# Patient Record
Sex: MALE | Race: OTHER | URBAN - METROPOLITAN AREA
[De-identification: names, ages, dates, MRNs, and addresses within clinical notes are randomized per-mention and may not be internally consistent; named-entity substitution may affect disease eponyms.]

---

## 2023-04-14 ENCOUNTER — HOSPITAL ENCOUNTER (OUTPATIENT)
Dept: RADIOLOGY | Facility: MEDICAL CENTER | Age: 43
End: 2023-04-14
Payer: COMMERCIAL

## 2023-04-14 DIAGNOSIS — Z87.898 HISTORY OF SOLITARY PULMONARY NODULE: ICD-10-CM

## 2023-04-14 PROCEDURE — 700117 HCHG RX CONTRAST REV CODE 255

## 2023-04-14 PROCEDURE — 71260 CT THORAX DX C+: CPT

## 2023-04-14 RX ADMIN — IOHEXOL 75 ML: 350 INJECTION, SOLUTION INTRAVENOUS at 10:59

## 2025-01-13 ENCOUNTER — HOSPITAL ENCOUNTER (INPATIENT)
Facility: MEDICAL CENTER | Age: 45
LOS: 2 days | DRG: 390 | End: 2025-01-16
Attending: STUDENT IN AN ORGANIZED HEALTH CARE EDUCATION/TRAINING PROGRAM | Admitting: SURGERY
Payer: COMMERCIAL

## 2025-01-13 DIAGNOSIS — R91.1 PULMONARY NODULE LESS THAN 6 MM DETERMINED BY COMPUTED TOMOGRAPHY OF LUNG: ICD-10-CM

## 2025-01-13 DIAGNOSIS — K56.609 SMALL BOWEL OBSTRUCTION (HCC): ICD-10-CM

## 2025-01-13 DIAGNOSIS — K76.0 HEPATIC STEATOSIS: ICD-10-CM

## 2025-01-13 DIAGNOSIS — I51.7 CARDIOMEGALY: ICD-10-CM

## 2025-01-13 LAB
ALBUMIN SERPL BCP-MCNC: 5 G/DL (ref 3.2–4.9)
ALBUMIN/GLOB SERPL: 1.4 G/DL
ALP SERPL-CCNC: 100 U/L (ref 30–99)
ALT SERPL-CCNC: 58 U/L (ref 2–50)
ANION GAP SERPL CALC-SCNC: 16 MMOL/L (ref 7–16)
AST SERPL-CCNC: 30 U/L (ref 12–45)
BASOPHILS # BLD AUTO: 0.7 % (ref 0–1.8)
BASOPHILS # BLD: 0.12 K/UL (ref 0–0.12)
BILIRUB SERPL-MCNC: 0.5 MG/DL (ref 0.1–1.5)
BUN SERPL-MCNC: 10 MG/DL (ref 8–22)
CALCIUM ALBUM COR SERPL-MCNC: 9.2 MG/DL (ref 8.5–10.5)
CALCIUM SERPL-MCNC: 10 MG/DL (ref 8.5–10.5)
CHLORIDE SERPL-SCNC: 103 MMOL/L (ref 96–112)
CO2 SERPL-SCNC: 21 MMOL/L (ref 20–33)
CREAT SERPL-MCNC: 0.83 MG/DL (ref 0.5–1.4)
EKG IMPRESSION: NORMAL
EOSINOPHIL # BLD AUTO: 0.63 K/UL (ref 0–0.51)
EOSINOPHIL NFR BLD: 3.6 % (ref 0–6.9)
ERYTHROCYTE [DISTWIDTH] IN BLOOD BY AUTOMATED COUNT: 37.8 FL (ref 35.9–50)
GFR SERPLBLD CREATININE-BSD FMLA CKD-EPI: 109 ML/MIN/1.73 M 2
GLOBULIN SER CALC-MCNC: 3.5 G/DL (ref 1.9–3.5)
GLUCOSE SERPL-MCNC: 176 MG/DL (ref 65–99)
HCT VFR BLD AUTO: 53.3 % (ref 42–52)
HGB BLD-MCNC: 18.9 G/DL (ref 14–18)
IMM GRANULOCYTES # BLD AUTO: 0.07 K/UL (ref 0–0.11)
IMM GRANULOCYTES NFR BLD AUTO: 0.4 % (ref 0–0.9)
LIPASE SERPL-CCNC: 40 U/L (ref 11–82)
LYMPHOCYTES # BLD AUTO: 1.9 K/UL (ref 1–4.8)
LYMPHOCYTES NFR BLD: 10.9 % (ref 22–41)
MCH RBC QN AUTO: 29.3 PG (ref 27–33)
MCHC RBC AUTO-ENTMCNC: 35.5 G/DL (ref 32.3–36.5)
MCV RBC AUTO: 82.8 FL (ref 81.4–97.8)
MONOCYTES # BLD AUTO: 0.93 K/UL (ref 0–0.85)
MONOCYTES NFR BLD AUTO: 5.3 % (ref 0–13.4)
NEUTROPHILS # BLD AUTO: 13.82 K/UL (ref 1.82–7.42)
NEUTROPHILS NFR BLD: 79.1 % (ref 44–72)
NRBC # BLD AUTO: 0 K/UL
NRBC BLD-RTO: 0 /100 WBC (ref 0–0.2)
PLATELET # BLD AUTO: 264 K/UL (ref 164–446)
PMV BLD AUTO: 10.5 FL (ref 9–12.9)
POTASSIUM SERPL-SCNC: 4.2 MMOL/L (ref 3.6–5.5)
PROT SERPL-MCNC: 8.5 G/DL (ref 6–8.2)
RBC # BLD AUTO: 6.44 M/UL (ref 4.7–6.1)
SODIUM SERPL-SCNC: 140 MMOL/L (ref 135–145)
WBC # BLD AUTO: 17.5 K/UL (ref 4.8–10.8)

## 2025-01-13 PROCEDURE — 80053 COMPREHEN METABOLIC PANEL: CPT

## 2025-01-13 PROCEDURE — 84484 ASSAY OF TROPONIN QUANT: CPT

## 2025-01-13 PROCEDURE — 36415 COLL VENOUS BLD VENIPUNCTURE: CPT

## 2025-01-13 PROCEDURE — 99285 EMERGENCY DEPT VISIT HI MDM: CPT

## 2025-01-13 PROCEDURE — 85025 COMPLETE CBC W/AUTO DIFF WBC: CPT

## 2025-01-13 PROCEDURE — 93005 ELECTROCARDIOGRAM TRACING: CPT | Mod: TC | Performed by: STUDENT IN AN ORGANIZED HEALTH CARE EDUCATION/TRAINING PROGRAM

## 2025-01-13 PROCEDURE — 93005 ELECTROCARDIOGRAM TRACING: CPT | Mod: TC

## 2025-01-13 PROCEDURE — 83690 ASSAY OF LIPASE: CPT

## 2025-01-13 RX ORDER — SODIUM CHLORIDE 9 MG/ML
INJECTION, SOLUTION INTRAVENOUS ONCE
Status: COMPLETED | OUTPATIENT
Start: 2025-01-14 | End: 2025-01-14

## 2025-01-13 RX ORDER — MORPHINE SULFATE 4 MG/ML
4 INJECTION INTRAVENOUS ONCE
Status: COMPLETED | OUTPATIENT
Start: 2025-01-14 | End: 2025-01-14

## 2025-01-13 RX ORDER — ONDANSETRON 2 MG/ML
8 INJECTION INTRAMUSCULAR; INTRAVENOUS ONCE
Status: COMPLETED | OUTPATIENT
Start: 2025-01-14 | End: 2025-01-14

## 2025-01-13 ASSESSMENT — PAIN DESCRIPTION - PAIN TYPE: TYPE: ACUTE PAIN

## 2025-01-14 ENCOUNTER — APPOINTMENT (OUTPATIENT)
Dept: RADIOLOGY | Facility: MEDICAL CENTER | Age: 45
DRG: 390 | End: 2025-01-14
Attending: SURGERY
Payer: COMMERCIAL

## 2025-01-14 ENCOUNTER — APPOINTMENT (OUTPATIENT)
Dept: RADIOLOGY | Facility: MEDICAL CENTER | Age: 45
DRG: 390 | End: 2025-01-14
Attending: STUDENT IN AN ORGANIZED HEALTH CARE EDUCATION/TRAINING PROGRAM
Payer: COMMERCIAL

## 2025-01-14 PROBLEM — K56.600 SMALL BOWEL OBSTRUCTION, PARTIAL (HCC): Status: ACTIVE | Noted: 2025-01-14

## 2025-01-14 LAB
ANION GAP SERPL CALC-SCNC: 10 MMOL/L (ref 7–16)
APPEARANCE UR: CLEAR
BILIRUB UR QL STRIP.AUTO: NEGATIVE
BUN SERPL-MCNC: 10 MG/DL (ref 8–22)
CALCIUM SERPL-MCNC: 9.3 MG/DL (ref 8.5–10.5)
CHLORIDE SERPL-SCNC: 104 MMOL/L (ref 96–112)
CO2 SERPL-SCNC: 23 MMOL/L (ref 20–33)
COLOR UR: YELLOW
CREAT SERPL-MCNC: 0.87 MG/DL (ref 0.5–1.4)
EKG IMPRESSION: NORMAL
GFR SERPLBLD CREATININE-BSD FMLA CKD-EPI: 108 ML/MIN/1.73 M 2
GLUCOSE BLD STRIP.AUTO-MCNC: 118 MG/DL (ref 65–99)
GLUCOSE BLD STRIP.AUTO-MCNC: 139 MG/DL (ref 65–99)
GLUCOSE SERPL-MCNC: 163 MG/DL (ref 65–99)
GLUCOSE UR STRIP.AUTO-MCNC: NEGATIVE MG/DL
KETONES UR STRIP.AUTO-MCNC: NEGATIVE MG/DL
LEUKOCYTE ESTERASE UR QL STRIP.AUTO: NEGATIVE
MICRO URNS: NORMAL
NITRITE UR QL STRIP.AUTO: NEGATIVE
PH UR STRIP.AUTO: 7.5 [PH] (ref 5–8)
POTASSIUM SERPL-SCNC: 4.5 MMOL/L (ref 3.6–5.5)
PROT UR QL STRIP: NEGATIVE MG/DL
RBC UR QL AUTO: NEGATIVE
SODIUM SERPL-SCNC: 137 MMOL/L (ref 135–145)
SP GR UR STRIP.AUTO: 1.01
TROPONIN T SERPL-MCNC: <6 NG/L (ref 6–19)
UROBILINOGEN UR STRIP.AUTO-MCNC: 1 EU/DL

## 2025-01-14 PROCEDURE — 36415 COLL VENOUS BLD VENIPUNCTURE: CPT

## 2025-01-14 PROCEDURE — 96375 TX/PRO/DX INJ NEW DRUG ADDON: CPT

## 2025-01-14 PROCEDURE — 700105 HCHG RX REV CODE 258: Performed by: SURGERY

## 2025-01-14 PROCEDURE — 700117 HCHG RX CONTRAST REV CODE 255: Performed by: STUDENT IN AN ORGANIZED HEALTH CARE EDUCATION/TRAINING PROGRAM

## 2025-01-14 PROCEDURE — 81003 URINALYSIS AUTO W/O SCOPE: CPT

## 2025-01-14 PROCEDURE — 80048 BASIC METABOLIC PNL TOTAL CA: CPT

## 2025-01-14 PROCEDURE — 700111 HCHG RX REV CODE 636 W/ 250 OVERRIDE (IP): Mod: JZ | Performed by: SURGERY

## 2025-01-14 PROCEDURE — 99222 1ST HOSP IP/OBS MODERATE 55: CPT | Mod: AI | Performed by: SURGERY

## 2025-01-14 PROCEDURE — 770001 HCHG ROOM/CARE - MED/SURG/GYN PRIV*

## 2025-01-14 PROCEDURE — 700105 HCHG RX REV CODE 258: Performed by: STUDENT IN AN ORGANIZED HEALTH CARE EDUCATION/TRAINING PROGRAM

## 2025-01-14 PROCEDURE — 93005 ELECTROCARDIOGRAM TRACING: CPT | Mod: TC | Performed by: PHYSICIAN ASSISTANT

## 2025-01-14 PROCEDURE — 700101 HCHG RX REV CODE 250: Performed by: STUDENT IN AN ORGANIZED HEALTH CARE EDUCATION/TRAINING PROGRAM

## 2025-01-14 PROCEDURE — 96374 THER/PROPH/DIAG INJ IV PUSH: CPT

## 2025-01-14 PROCEDURE — 74177 CT ABD & PELVIS W/CONTRAST: CPT

## 2025-01-14 PROCEDURE — 76705 ECHO EXAM OF ABDOMEN: CPT

## 2025-01-14 PROCEDURE — 93010 ELECTROCARDIOGRAM REPORT: CPT | Performed by: INTERNAL MEDICINE

## 2025-01-14 PROCEDURE — 71045 X-RAY EXAM CHEST 1 VIEW: CPT

## 2025-01-14 PROCEDURE — 96376 TX/PRO/DX INJ SAME DRUG ADON: CPT

## 2025-01-14 PROCEDURE — 82962 GLUCOSE BLOOD TEST: CPT | Mod: 91

## 2025-01-14 PROCEDURE — A9270 NON-COVERED ITEM OR SERVICE: HCPCS | Performed by: SURGERY

## 2025-01-14 PROCEDURE — 700102 HCHG RX REV CODE 250 W/ 637 OVERRIDE(OP): Performed by: SURGERY

## 2025-01-14 PROCEDURE — 700111 HCHG RX REV CODE 636 W/ 250 OVERRIDE (IP): Mod: JZ | Performed by: STUDENT IN AN ORGANIZED HEALTH CARE EDUCATION/TRAINING PROGRAM

## 2025-01-14 RX ORDER — ACETAMINOPHEN 500 MG
1000 TABLET ORAL EVERY 6 HOURS
Status: DISCONTINUED | OUTPATIENT
Start: 2025-01-14 | End: 2025-01-15

## 2025-01-14 RX ORDER — SODIUM CHLORIDE, SODIUM LACTATE, POTASSIUM CHLORIDE, CALCIUM CHLORIDE 600; 310; 30; 20 MG/100ML; MG/100ML; MG/100ML; MG/100ML
INJECTION, SOLUTION INTRAVENOUS CONTINUOUS
Status: DISCONTINUED | OUTPATIENT
Start: 2025-01-14 | End: 2025-01-16

## 2025-01-14 RX ORDER — CELECOXIB 200 MG/1
200 CAPSULE ORAL 2 TIMES DAILY
Status: DISCONTINUED | OUTPATIENT
Start: 2025-01-14 | End: 2025-01-15

## 2025-01-14 RX ORDER — ONDANSETRON 2 MG/ML
4 INJECTION INTRAMUSCULAR; INTRAVENOUS EVERY 4 HOURS PRN
Status: DISCONTINUED | OUTPATIENT
Start: 2025-01-14 | End: 2025-01-16 | Stop reason: HOSPADM

## 2025-01-14 RX ORDER — LIDOCAINE HYDROCHLORIDE 20 MG/ML
JELLY TOPICAL ONCE
Status: COMPLETED | OUTPATIENT
Start: 2025-01-14 | End: 2025-01-14

## 2025-01-14 RX ORDER — DOCUSATE SODIUM 100 MG/1
100 CAPSULE, LIQUID FILLED ORAL 2 TIMES DAILY
Status: DISCONTINUED | OUTPATIENT
Start: 2025-01-14 | End: 2025-01-14

## 2025-01-14 RX ORDER — AMOXICILLIN 250 MG
1 CAPSULE ORAL
Status: DISCONTINUED | OUTPATIENT
Start: 2025-01-14 | End: 2025-01-15

## 2025-01-14 RX ORDER — ENOXAPARIN SODIUM 100 MG/ML
40 INJECTION SUBCUTANEOUS DAILY
Status: DISCONTINUED | OUTPATIENT
Start: 2025-01-15 | End: 2025-01-16 | Stop reason: HOSPADM

## 2025-01-14 RX ORDER — AMOXICILLIN 250 MG
1 CAPSULE ORAL NIGHTLY
Status: DISCONTINUED | OUTPATIENT
Start: 2025-01-14 | End: 2025-01-15

## 2025-01-14 RX ORDER — CELECOXIB 200 MG/1
200 CAPSULE ORAL 2 TIMES DAILY PRN
Status: DISCONTINUED | OUTPATIENT
Start: 2025-01-19 | End: 2025-01-15

## 2025-01-14 RX ORDER — ONDANSETRON 4 MG/1
4 TABLET, ORALLY DISINTEGRATING ORAL EVERY 4 HOURS PRN
Status: DISCONTINUED | OUTPATIENT
Start: 2025-01-14 | End: 2025-01-15

## 2025-01-14 RX ORDER — ACETAMINOPHEN 500 MG
1000 TABLET ORAL EVERY 6 HOURS PRN
Status: DISCONTINUED | OUTPATIENT
Start: 2025-01-19 | End: 2025-01-15

## 2025-01-14 RX ORDER — OXYCODONE HYDROCHLORIDE 10 MG/1
10 TABLET ORAL
Status: DISCONTINUED | OUTPATIENT
Start: 2025-01-14 | End: 2025-01-15

## 2025-01-14 RX ORDER — BISACODYL 10 MG
10 SUPPOSITORY, RECTAL RECTAL
Status: DISCONTINUED | OUTPATIENT
Start: 2025-01-14 | End: 2025-01-16 | Stop reason: HOSPADM

## 2025-01-14 RX ORDER — HYDROMORPHONE HYDROCHLORIDE 1 MG/ML
0.5 INJECTION, SOLUTION INTRAMUSCULAR; INTRAVENOUS; SUBCUTANEOUS
Status: DISCONTINUED | OUTPATIENT
Start: 2025-01-14 | End: 2025-01-15

## 2025-01-14 RX ORDER — SODIUM CHLORIDE, SODIUM LACTATE, POTASSIUM CHLORIDE, AND CALCIUM CHLORIDE .6; .31; .03; .02 G/100ML; G/100ML; G/100ML; G/100ML
1000 INJECTION, SOLUTION INTRAVENOUS ONCE
Status: COMPLETED | OUTPATIENT
Start: 2025-01-14 | End: 2025-01-14

## 2025-01-14 RX ORDER — DOCUSATE SODIUM 50 MG/5ML
100 LIQUID ORAL 2 TIMES DAILY
Status: DISCONTINUED | OUTPATIENT
Start: 2025-01-14 | End: 2025-01-15

## 2025-01-14 RX ORDER — OXYCODONE HYDROCHLORIDE 5 MG/1
5 TABLET ORAL
Status: DISCONTINUED | OUTPATIENT
Start: 2025-01-14 | End: 2025-01-15

## 2025-01-14 RX ORDER — FAMOTIDINE 20 MG/1
20 TABLET, FILM COATED ORAL 2 TIMES DAILY
Status: DISCONTINUED | OUTPATIENT
Start: 2025-01-14 | End: 2025-01-15

## 2025-01-14 RX ORDER — POLYETHYLENE GLYCOL 3350 17 G/17G
1 POWDER, FOR SOLUTION ORAL 2 TIMES DAILY
Status: DISCONTINUED | OUTPATIENT
Start: 2025-01-14 | End: 2025-01-15

## 2025-01-14 RX ADMIN — ACETAMINOPHEN 1000 MG: 500 TABLET ORAL at 18:00

## 2025-01-14 RX ADMIN — SODIUM CHLORIDE: 9 INJECTION, SOLUTION INTRAVENOUS at 00:03

## 2025-01-14 RX ADMIN — HYDROMORPHONE HYDROCHLORIDE 0.5 MG: 1 INJECTION, SOLUTION INTRAMUSCULAR; INTRAVENOUS; SUBCUTANEOUS at 02:06

## 2025-01-14 RX ADMIN — FAMOTIDINE 20 MG: 10 INJECTION, SOLUTION INTRAVENOUS at 05:52

## 2025-01-14 RX ADMIN — MORPHINE SULFATE 4 MG: 4 INJECTION INTRAVENOUS at 00:03

## 2025-01-14 RX ADMIN — SODIUM CHLORIDE, POTASSIUM CHLORIDE, SODIUM LACTATE AND CALCIUM CHLORIDE: 600; 310; 30; 20 INJECTION, SOLUTION INTRAVENOUS at 13:05

## 2025-01-14 RX ADMIN — FAMOTIDINE 20 MG: 10 INJECTION, SOLUTION INTRAVENOUS at 18:00

## 2025-01-14 RX ADMIN — SODIUM CHLORIDE, POTASSIUM CHLORIDE, SODIUM LACTATE AND CALCIUM CHLORIDE 1000 ML: 600; 310; 30; 20 INJECTION, SOLUTION INTRAVENOUS at 10:53

## 2025-01-14 RX ADMIN — ONDANSETRON 8 MG: 2 INJECTION INTRAMUSCULAR; INTRAVENOUS at 00:03

## 2025-01-14 RX ADMIN — FAMOTIDINE 20 MG: 10 INJECTION, SOLUTION INTRAVENOUS at 00:03

## 2025-01-14 RX ADMIN — SODIUM CHLORIDE, POTASSIUM CHLORIDE, SODIUM LACTATE AND CALCIUM CHLORIDE: 600; 310; 30; 20 INJECTION, SOLUTION INTRAVENOUS at 21:54

## 2025-01-14 RX ADMIN — ACETAMINOPHEN 1000 MG: 500 TABLET ORAL at 22:02

## 2025-01-14 RX ADMIN — LIDOCAINE HYDROCHLORIDE 1 APPLICATION: 20 JELLY TOPICAL at 01:05

## 2025-01-14 RX ADMIN — SENNOSIDES AND DOCUSATE SODIUM 1 TABLET: 50; 8.6 TABLET ORAL at 21:53

## 2025-01-14 RX ADMIN — IOHEXOL 100 ML: 350 INJECTION, SOLUTION INTRAVENOUS at 00:33

## 2025-01-14 RX ADMIN — SODIUM CHLORIDE, POTASSIUM CHLORIDE, SODIUM LACTATE AND CALCIUM CHLORIDE: 600; 310; 30; 20 INJECTION, SOLUTION INTRAVENOUS at 01:40

## 2025-01-14 RX ADMIN — OXYCODONE 5 MG: 5 TABLET ORAL at 18:01

## 2025-01-14 RX ADMIN — CELECOXIB 200 MG: 200 CAPSULE ORAL at 18:00

## 2025-01-14 RX ADMIN — CELECOXIB 200 MG: 200 CAPSULE ORAL at 05:52

## 2025-01-14 SDOH — ECONOMIC STABILITY: TRANSPORTATION INSECURITY
IN THE PAST 12 MONTHS, HAS LACK OF RELIABLE TRANSPORTATION KEPT YOU FROM MEDICAL APPOINTMENTS, MEETINGS, WORK OR FROM GETTING THINGS NEEDED FOR DAILY LIVING?: NO

## 2025-01-14 SDOH — ECONOMIC STABILITY: TRANSPORTATION INSECURITY
IN THE PAST 12 MONTHS, HAS THE LACK OF TRANSPORTATION KEPT YOU FROM MEDICAL APPOINTMENTS OR FROM GETTING MEDICATIONS?: NO

## 2025-01-14 ASSESSMENT — PATIENT HEALTH QUESTIONNAIRE - PHQ9
1. LITTLE INTEREST OR PLEASURE IN DOING THINGS: NOT AT ALL
2. FEELING DOWN, DEPRESSED, IRRITABLE, OR HOPELESS: NOT AT ALL
SUM OF ALL RESPONSES TO PHQ9 QUESTIONS 1 AND 2: 0

## 2025-01-14 ASSESSMENT — LIFESTYLE VARIABLES
AVERAGE NUMBER OF DAYS PER WEEK YOU HAVE A DRINK CONTAINING ALCOHOL: 0
HOW MANY TIMES IN THE PAST YEAR HAVE YOU HAD 5 OR MORE DRINKS IN A DAY: 0
ON A TYPICAL DAY WHEN YOU DRINK ALCOHOL HOW MANY DRINKS DO YOU HAVE: 0
TOTAL SCORE: 0
ALCOHOL_USE: NO
HAVE PEOPLE ANNOYED YOU BY CRITICIZING YOUR DRINKING: NO
EVER HAD A DRINK FIRST THING IN THE MORNING TO STEADY YOUR NERVES TO GET RID OF A HANGOVER: NO
DOES PATIENT WANT TO STOP DRINKING: NO
TOTAL SCORE: 0
CONSUMPTION TOTAL: NEGATIVE
EVER FELT BAD OR GUILTY ABOUT YOUR DRINKING: NO
HAVE YOU EVER FELT YOU SHOULD CUT DOWN ON YOUR DRINKING: NO
TOTAL SCORE: 0

## 2025-01-14 ASSESSMENT — COPD QUESTIONNAIRES
DO YOU EVER COUGH UP ANY MUCUS OR PHLEGM?: NO/ONLY WITH OCCASIONAL COLDS OR INFECTIONS
HAVE YOU SMOKED AT LEAST 100 CIGARETTES IN YOUR ENTIRE LIFE: NO/DON'T KNOW
DURING THE PAST 4 WEEKS HOW MUCH DID YOU FEEL SHORT OF BREATH: NONE/LITTLE OF THE TIME
COPD SCREENING SCORE: 0

## 2025-01-14 ASSESSMENT — COGNITIVE AND FUNCTIONAL STATUS - GENERAL
SUGGESTED CMS G CODE MODIFIER MOBILITY: CI
DRESSING REGULAR LOWER BODY CLOTHING: A LITTLE
MOBILITY SCORE: 23
DAILY ACTIVITIY SCORE: 22
MOVING TO AND FROM BED TO CHAIR: A LITTLE
SUGGESTED CMS G CODE MODIFIER DAILY ACTIVITY: CJ
DRESSING REGULAR UPPER BODY CLOTHING: A LITTLE

## 2025-01-14 ASSESSMENT — SOCIAL DETERMINANTS OF HEALTH (SDOH)
WITHIN THE LAST YEAR, HAVE YOU BEEN HUMILIATED OR EMOTIONALLY ABUSED IN OTHER WAYS BY YOUR PARTNER OR EX-PARTNER?: NO
IN THE PAST 12 MONTHS, HAS THE ELECTRIC, GAS, OIL, OR WATER COMPANY THREATENED TO SHUT OFF SERVICE IN YOUR HOME?: NO
WITHIN THE PAST 12 MONTHS, YOU WORRIED THAT YOUR FOOD WOULD RUN OUT BEFORE YOU GOT THE MONEY TO BUY MORE: NEVER TRUE
WITHIN THE LAST YEAR, HAVE YOU BEEN KICKED, HIT, SLAPPED, OR OTHERWISE PHYSICALLY HURT BY YOUR PARTNER OR EX-PARTNER?: NO
WITHIN THE LAST YEAR, HAVE YOU BEEN AFRAID OF YOUR PARTNER OR EX-PARTNER?: NO
WITHIN THE LAST YEAR, HAVE TO BEEN RAPED OR FORCED TO HAVE ANY KIND OF SEXUAL ACTIVITY BY YOUR PARTNER OR EX-PARTNER?: NO
WITHIN THE PAST 12 MONTHS, THE FOOD YOU BOUGHT JUST DIDN'T LAST AND YOU DIDN'T HAVE MONEY TO GET MORE: NEVER TRUE

## 2025-01-14 ASSESSMENT — PAIN DESCRIPTION - PAIN TYPE
TYPE: ACUTE PAIN

## 2025-01-14 NOTE — ED NOTES
Bedside report received from off going RN/tech: Tanika, assumed care of patient.  POC discussed with patient. Call light within reach, all needs addressed at this time.       Fall risk interventions in place: Give patient urinal if applicable (all applicable per [place] Fall risk assessment)   Continuous monitoring: Cardiac Leads, Pulse Ox, or Blood Pressure  IVF/IV medications: Infusion per MAR (List Med(s)) LR  Oxygen: How many liters 4L, Traced the line to wall oxygen, and No oxygen tank in room  Bedside sitter: Not Applicable   Isolation: Not Applicable

## 2025-01-14 NOTE — ED NOTES
Report to FERNANDA Sagastume    Pt on 4LPM via oxymask to wall O2. Pt does not require fall precautions at this time.

## 2025-01-14 NOTE — PROGRESS NOTES
"Patient admitted to room T405 via transport in [facility] from ED at 1209.  /82   Pulse 94   Temp 36.9 °C (98.4 °F) (Temporal)   Resp 16   Ht 1.65 m (5' 4.96\")   Wt 86 kg (189 lb 9.5 oz)   SpO2 95%   BMI 31.59 kg/m²     Patient reports pain at 0 on a scale of 0-10. Educated patient regarding pharmacologic and non pharmacologic modalities for pain management. Oriented to room call light and smoking policy.  Reviewed plan of care (equipment, incentive spirometer, sequential compression devices, medications, activity, diet, fall precautions, skin care, and pain) with patient and family. Welcome packet given and reviewed with patient, all questions answered. Education provided on oral hygiene program.    AA&Ox4. Denies CP/SOB.  See 2 RN skin note  Patient is NPO sips w/meds. Denies N/V.  + void. + BM. Last BM 1/14  Pt ambulates w/standby assist.  All needs met at this time. Call light within reach. Pt calls appropriately. Bed low and locked, non skid socks in place. Hourly rounding in place.  "

## 2025-01-14 NOTE — ED NOTES
Pt reports feeling super hot, vitals assessed and pt appears to have had vagal response. Suction paused and admitting MD contacted. Admitting MD confirms suspicion of vagal response, reports to start suction again and watch pt BP, if no improvement provide bolus. NG returned to low suction    Pt vitals noted to improve and pt medicated for pain

## 2025-01-14 NOTE — ED NOTES
Med Rec competed per patient and son at bedside interpreting      Allergies reviewed: yes     Antibiotics in the past 30 days: no    Anticoagulant in past 14 days: no    Pharmacy patient utilizes: [facility]  [phone]    Per patient he is not taking any RX or OTC medications at this time

## 2025-01-14 NOTE — PROGRESS NOTES
Virtual Nurse rounding complete. VRN portion of admission profile completed using  #[id number]    Round Needs: Reports new mild epigastric pain. RN notified.

## 2025-01-14 NOTE — ED PROVIDER NOTES
"ED Provider Note    CHIEF COMPLAINT  Chief Complaint   Patient presents with    Epigastric Pain     Started today while working, , 10/10 pain, feels bloated, +nausea, -V/D.         HPI/ROS  LIMITATION TO HISTORY   Select: : None  OUTSIDE HISTORIAN(S):    Jamey Mojica is a 44 y.o. male who presents with acute onset abdominal pain that started at 1 PM today.  Patient reports that he had ham and eggs and coffee in the morning and chicken soup in the afternoon.  Patient reports nausea but no vomiting.  Patient denies black or bloody stools.  Patient reports feeling bloated.  Patient reports severe pain.  Patient reports pain is mainly in the upper abdomen.  Patient has no history of surgeries.  Patient reports he has not passed stool at all today.    PAST MEDICAL HISTORY       SURGICAL HISTORY  patient denies any surgical history    FAMILY HISTORY  History reviewed. No pertinent family history.    SOCIAL HISTORY  Social History     Tobacco Use    Smoking status: Never    Smokeless tobacco: Never   Substance and Sexual Activity    Alcohol use: Not Currently    Drug use: Not Currently    Sexual activity: Not on file       CURRENT MEDICATIONS  Home Medications       Reviewed by Carl Gao R.N. (Registered Nurse) on 01/13/25 at 2142  Med List Status: Partial     Medication Last Dose Status        Patient Alex Taking any Medications                         Audit from Redirected Encounters    **Home medications have not yet been reviewed for this encounter**         ALLERGIES  No Known Allergies    PHYSICAL EXAM  VITAL SIGNS: BP (!) 131/101   Pulse 83   Temp 36.7 °C (98 °F) (Temporal)   Resp 15   Ht 1.65 m (5' 4.96\")   Wt 86 kg (189 lb 9.5 oz)   SpO2 95%   BMI 31.59 kg/m²    Vitals and nursing note reviewed.   Constitutional:       Comments: Patient is lying in bed supine, pleasant, conversant, speaking in complete sentences, uncomfortable appearing  HENT:      Head: Normocephalic and " atraumatic.   Eyes:      Extraocular Movements: Extraocular movements intact.      Conjunctiva/sclera: Conjunctivae normal.      Pupils: Pupils are equal, round, and reactive to light.   Cardiovascular:      Pulses: Normal pulses.      Comments: HR 96  Pulmonary:      Effort: Pulmonary effort is normal. No respiratory distress.   Abdominal:      Comments: Abdomen is soft, distended, decreased bowel sounds, significant upper abdominal tenderness to palpation  Musculoskeletal:         General: No swelling. Normal range of motion.      Cervical back: Normal range of motion. No rigidity.   Skin:     General: Skin is warm and dry.      Capillary Refill: Capillary refill takes less than 2 seconds.   Neurological:      Mental Status: Alert.       EKG/LABS  No ischemic changes  I have independently interpreted this EKG    RADIOLOGY/PROCEDURES   I have independently interpreted the diagnostic imaging associated with this visit and am waiting the final reading from the radiologist.   My preliminary interpretation is as follows: Small bowel obstruction    Radiologist interpretation:  DX-CHEST-LIMITED (1 VIEW)   Final Result         1.  Left basilar atelectasis, no focal infiltrate   2.  Cardiomegaly      CT-ABDOMEN-PELVIS WITH   Final Result         1.  Fluid-filled proximal small bowel loops with distal decompression and probable right lower abdominal transition point, appearance favoring changes of small bowel obstruction.   2.  Hepatomegaly   3.  Changes of hepatic steatosis   4.  Pulmonary nodules, see nodule follow-up recommendations below.      Fleischner Society pulmonary nodule recommendations:   Low Risk: No routine follow-up      High Risk: Optional CT at 12 months      Comments: Use most suspicious nodule as guide to management. Follow-up intervals may vary according to size and risk.      Low Risk - Minimal or absent history of smoking and of other known risk factors.      High Risk - History of smoking or of other  known risk factors.      Note: These recommendations do not apply to lung cancer screening, patients with immunosuppression, or patients with known primary cancer.      Fleischner Society 2017 Guidelines for Management of Incidentally Detected Pulmonary Nodules in Adults         US-RUQ   Final Result         1.  Hepatomegaly   2.  Echogenic liver, compatible with fatty change versus fibrosis.          COURSE & MEDICAL DECISION MAKING    ASSESSMENT, COURSE AND PLAN  Care Narrative: Morphine, famotidine, Zofran for symptom control.  CBC to evaluate for acute anemia and leukocytosis.  CMP to evaluate for acute electrolyte abnormality, acute kidney injury, acute liver failure or dysfunction.  CT abdomen pelvis to evaluate for obstruction.  Ultrasound to evaluate for cholecystitis.  Chest x-ray to evaluate for acute cardiopulmonary process such as pneumonia, pulmonary edema, pneumothorax.  Lipase to evaluate for pancreatitis.    Electronically signed by: Mert White M.D., 1/13/2025 11:59 PM    Patient with evidence of small bowel obstruction.  NG order has been placed.  General surgery will come to the bedside and admit the patient under the care of Dr. Vazquez.  Pulmonary nodules present as well.  Patient has no elevated risk however.    This dictation has been created using voice recognition software. I am continuously working with the software to minimize the number of voice recognition errors and I have made every attempt to manually correct the errors within my dictation. However errors  related to this voice recognition software may still exist and should be interpreted within the appropriate context.     Electronically signed by: Mert White M.D., 1/14/2025 1:09 AM      DISPOSITION AND DISCUSSIONS  I have discussed management of the patient with the following physicians and PAT's:  Dr Vazquez    FINAL DIAGNOSIS  1. Pulmonary nodule less than 6 mm determined by computed tomography of lung    2. Small  bowel obstruction (HCC)    3. Hepatic steatosis    4. Cardiomegaly         Electronically signed by: Mert White M.D., 1/13/2025 11:56 PM

## 2025-01-14 NOTE — H&P
"    DATE OF CONSULTATION:  1/14/2025     REFERRING PHYSICIAN:   Mert White M.D.     CONSULTING PHYSICIAN:  Chaz Vazquez M.D.     REASON FOR CONSULTATION:  I have been asked by Dr. White to see the patient in surgical consultation for evaluation of a small bowel obstruction.    HISTORY OF PRESENT ILLNESS: The patient is a 44 year-old  man who presents to the Emergency Department with a 12- hour history of severe epigastric abdominal pain. The pain is associated with nausea, vomiting, and abdominal distension. He The patient denies any recent or intercurrent illness. The patient denies any history of previous abdominal surgery. The patient denies any previous surgery for obstructive symptoms.    PAST MEDICAL HISTORY:  has no past medical history on file.    PAST SURGICAL HISTORY:  has no past surgical history on file.    ALLERGIES: No Known Allergies    CURRENT MEDICATIONS:    Home Medications       Reviewed by Carl Gao R.N. (Registered Nurse) on 01/13/25 at 2142  Med List Status: Partial     Medication Last Dose Status        Patient Alex Taking any Medications                         Audit from Redirected Encounters    **Home medications have not yet been reviewed for this encounter**     FAMILY HISTORY: family history is not on file.    SOCIAL HISTORY:  reports that he has never smoked. He has never used smokeless tobacco. He reports that he does not currently use alcohol. He reports that he does not currently use drugs.    REVIEW OF SYSTEMS: Comprehensive review of systems is negative with the exception of the aforementioned HPI, PMH, and PSH bullets in accordance with CMS guidelines.    PHYSICAL EXAMINATION:      Constitutional:     Vital Signs: BP (!) 131/101   Pulse 83   Temp 36.7 °C (98 °F) (Temporal)   Resp 15   Ht 1.65 m (5' 4.96\")   Wt 86 kg (189 lb 9.5 oz)   SpO2 95%    General Appearance: alert in no acute distress.   HEENT:    Demonstrates symmetric, reactive " pupils. Extraocular muscles   are intact. Nares and oropharynx are clear.   Neck:    Supple. No adenopathy.  Respiratory:   Inspection: Unlabored respirations, no intercostal retractions, paradoxical motion, or accessory muscle use.   Auscultation: clear to auscultation.  Cardiovascular:   Inspection: The skin is warm, dry, and well purfused.  Auscultation: Regular rate and rhythm.   Peripheral Pulses: Normal.   Abdomen:  Inspection: Abdominal inspection reveals mild abdominal distension.   Palpation: Palpation is remarkable for moderate tenderness in the epigastric region. No abdominal wall hernias.  Extremities:   Examination of the upper and lower extremities demonstrates no cyanosis edema or clubbing.  Neurologic:   Alert & oriented to person, time and place. Normal motor function. Normal sensory function. No focal deficits noted.  Psychiatric:   He does not appear depressed or anxious, short and long term memory appears intact, judgement and insight appear intact.    LABORATORY VALUES:   Recent Labs     01/13/25  2149   WBC 17.5*   RBC 6.44*   HEMOGLOBIN 18.9*   HEMATOCRIT 53.3*   MCV 82.8   MCH 29.3   MCHC 35.5   RDW 37.8   PLATELETCT 264   MPV 10.5     Recent Labs     01/13/25  2149   SODIUM 140   POTASSIUM 4.2   CHLORIDE 103   CO2 21   GLUCOSE 176*   BUN 10   CREATININE 0.83   CALCIUM 10.0     Recent Labs     01/13/25  2149   ASTSGOT 30   ALTSGPT 58*   TBILIRUBIN 0.5   ALKPHOSPHAT 100*   GLOBULIN 3.5     IMAGING:   DX-CHEST-LIMITED (1 VIEW)   Final Result         1.  Left basilar atelectasis, no focal infiltrate   2.  Cardiomegaly      CT-ABDOMEN-PELVIS WITH   Final Result         1.  Fluid-filled proximal small bowel loops with distal decompression and probable right lower abdominal transition point, appearance favoring changes of small bowel obstruction.   2.  Hepatomegaly   3.  Changes of hepatic steatosis   4.  Pulmonary nodules, see nodule follow-up recommendations below.      Fleischner Society  pulmonary nodule recommendations:   Low Risk: No routine follow-up      High Risk: Optional CT at 12 months      Comments: Use most suspicious nodule as guide to management. Follow-up intervals may vary according to size and risk.      Low Risk - Minimal or absent history of smoking and of other known risk factors.      High Risk - History of smoking or of other known risk factors.      Note: These recommendations do not apply to lung cancer screening, patients with immunosuppression, or patients with known primary cancer.      Fleischner Society 2017 Guidelines for Management of Incidentally Detected Pulmonary Nodules in Adults         US-RUQ   Final Result         1.  Hepatomegaly   2.  Echogenic liver, compatible with fatty change versus fibrosis.        ASSESSMENT AND PLAN:   The patient has clinical and radiographic findings of partial small bowel obstruction without generalized peritonitis, evidence of clinical deterioration, or radiographic findings consistent with bowel ischemia. Nonoperative management, nasogastric decompression, serial abdominal examination, and water-soluble contrast imaging within 48 hours if obstructive symptoms fail to resolve.     DISPOSITION: Admit [facility] Acute Care Surgery [place] Service.         ____________________________________     Chaz Vazquez M.D.    DD: 1/14/2025  1:06 AM

## 2025-01-14 NOTE — PROGRESS NOTES
"  DATE: 1/14/2025    Hospital Day 1 SBO .    INTERVAL EVENTS:  Abdominal pain improved some with NG tube decompression.  No flatus or BM.   Denies surgical history.     REVIEW OF SYSTEMS:  Comprehensive review of systems is negative with the exception of the aforementioned HPI, PMH, and PSH bullets in accordance with CMS guidelines.    PHYSICAL EXAMINATION:  Vital Signs: /87   Pulse 95   Temp 36.7 °C (98 °F) (Temporal)   Resp 14   Ht 1.65 m (5' 4.96\")   Wt 86 kg (189 lb 9.5 oz)   SpO2 94%   Physical Exam  Vitals and nursing note reviewed.   HENT:      Nose:      Comments: NG tube right nare to suction   Abdominal:      General: There is distension (mild).      Palpations: Abdomen is soft.      Tenderness: There is abdominal tenderness (diffuse).   Neurological:      Mental Status: He is alert.         LABORATORY VALUES:  Recent Labs     01/13/25 2149   WBC 17.5*   RBC 6.44*   HEMOGLOBIN 18.9*   HEMATOCRIT 53.3*   MCV 82.8   MCH 29.3   MCHC 35.5   RDW 37.8   PLATELETCT 264   MPV 10.5     Recent Labs     01/13/25 2149 01/14/25  0400   SODIUM 140 137   POTASSIUM 4.2 4.5   CHLORIDE 103 104   CO2 21 23   GLUCOSE 176* 163*   BUN 10 10   CREATININE 0.83 0.87   CALCIUM 10.0 9.3     Recent Labs     01/13/25 2149   ASTSGOT 30   ALTSGPT 58*   TBILIRUBIN 0.5   ALKPHOSPHAT 100*   GLOBULIN 3.5           IMAGING:  DX-ABDOMEN FOR TUBE PLACEMENT   Final Result         1.  Nonspecific bowel gas pattern in the upper abdomen.   2.  Nasogastric tube tip terminates overlying the expected location of the gastric body.   3.  Linear densities the bilateral lung bases favors changes of atelectasis      DX-CHEST-LIMITED (1 VIEW)   Final Result         1.  Left basilar atelectasis, no focal infiltrate   2.  Cardiomegaly      CT-ABDOMEN-PELVIS WITH   Final Result         1.  Fluid-filled proximal small bowel loops with distal decompression and probable right lower abdominal transition point, appearance favoring changes of small " bowel obstruction.   2.  Hepatomegaly   3.  Changes of hepatic steatosis   4.  Pulmonary nodules, see nodule follow-up recommendations below.      Fleischner Society pulmonary nodule recommendations:   Low Risk: No routine follow-up      High Risk: Optional CT at 12 months      Comments: Use most suspicious nodule as guide to management. Follow-up intervals may vary according to size and risk.      Low Risk - Minimal or absent history of smoking and of other known risk factors.      High Risk - History of smoking or of other known risk factors.      Note: These recommendations do not apply to lung cancer screening, patients with immunosuppression, or patients with known primary cancer.      Fleischner Society 2017 Guidelines for Management of Incidentally Detected Pulmonary Nodules in Adults         US-RUQ   Final Result         1.  Hepatomegaly   2.  Echogenic liver, compatible with fatty change versus fibrosis.          ASSESSMENT AND PLAN:  * Small bowel obstruction, partial (HCC)- (present on admission)  Assessment & Plan  Conservative management with nasogastric decompression and bowel rest.   1/14 NG tube remain to suction.   Plan for SBFT study within 48 hours.              ____________________________________     Keisha Gorman P.A.-C.    DD: 1/14/2025  11:16 AM

## 2025-01-14 NOTE — ED NOTES
Report given to Carol MICHAEL.  Pt to T405, on room air in stable condition with transport, all belongings with pt.  Family at bedside

## 2025-01-14 NOTE — ED TRIAGE NOTES
Chief Complaint   Patient presents with    Epigastric Pain     Started today while working, , 10/10 pain, feels bloated, +nausea, -V/D.       Vitals:    01/13/25 2128   BP: (!) 134/98   Pulse: 96   Resp: 20   Temp: 36.7 °C (98 °F)   SpO2: 99%     Patient ambulatory to triage for above complaint. Patient A&Ox4, GCS 15, patient speaking in full sentences. Equal and unlabored respirations. Patient educated on triage process and encouraged to notify staff if condition worsens. Appropriate protocols ordered. Patient returned to the lobby in stable condition.     
patient

## 2025-01-14 NOTE — NON-PROVIDER
"Acute Care Surgical Service Medicine Progress Note     Date: 2025 / Time: 7:18 AM     Patient:  Jamey Mojica - 44 y.o. male  PMD: No primary care provider on file.  Hospital Day # Hospital Day: 2    SUBJECTIVE:   Jamey Mojica is a 43 y/o Pashto speaking male who presented to the ED yesterday for severe epigastric abdominal pain secondary to SBO.     Patient reports bloating and epigastric pain is improving to a 6/10. Denies flatulence, BM or nausea since admission.     Current Therapies/Plan   -NG decompression, serial abdominal exam  -Follow up with small bowel follow through tomorrow if no improvement     OBJECTIVE:   Vitals:    Temp (24hrs), Av.7 °C (98 °F), Min:36.7 °C (98 °F), Max:36.7 °C (98 °F)     Oxygen: Pulse Oximetry: 98 %, O2 (LPM): 4, O2 Delivery Device: Oxymask  Patient Vitals for the past 24 hrs:   BP Temp Temp src Pulse Resp SpO2 Height Weight   25 0635 126/85 -- -- 81 18 98 % -- --   25 0605 134/83 -- -- 72 20 98 % -- --   25 0538 125/87 -- -- 79 18 98 % -- --   25 0508 131/85 -- -- 70 17 98 % -- --   25 0303 129/88 -- -- 76 20 98 % -- --   25 0247 -- -- -- 67 14 100 % -- --   25 0244 -- -- -- 76 17 88 % -- --   25 0233 (!) 122/90 -- -- 72 -- 92 % -- --   25 0206 117/77 -- -- 72 14 92 % -- --   25 0204 115/67 -- -- 68 17 93 % -- --   25 0158 (!) 86/52 -- -- (!) 58 (!) 21 96 % -- --   25 0103 127/84 -- -- 97 20 97 % -- --   25 0003 (!) 131/101 -- -- 83 15 95 % -- --   254 (!) 129/101 -- -- -- -- -- -- --   25 -- -- -- 92 (!) 26 97 % -- --   25 -- -- -- -- -- -- 1.65 m (5' 4.96\") 86 kg (189 lb 9.5 oz)   25 (!) 134/98 36.7 °C (98 °F) Temporal 96 20 99 % -- --       In/Out:    I/O last 3 completed shifts:  In: 1000 [I.V.:1000]  Out: -     IV Fluids/Feeds: Peripheral IV in place  Lines/Tubes: None     Physical Exam  Gen:  NAD, appears uncomfortable   HEENT: MMDHRUV, " EOMI, conjunctiva injected.   Cardio: RRR, clear s1/s2, no murmur  Resp:  Equal bilat, clear to auscultation  GI/: Soft, no visible hernias, mild distention, tender to palpation in the epigastric region, normal bowel sounds, no guarding/rebound  Neuro: Non-focal, Gross intact, no deficits  Skin/Extremities: Cap refill <3sec, warm/well perfused, no rash, normal extremities      Labs/X-ray:  Recent/pertinent lab results & imaging reviewed. 1/13/25 CBC   Elevated WBC at 17.5   Elevated HCT at 53.3    1/13/25 Abdomen Pelvis CT   1.  Fluid-filled proximal small bowel loops with distal decompression and probable right lower abdominal transition point, appearance favoring changes of small bowel obstruction.   2.  Hepatomegaly   3.  Changes of hepatic steatosis   4.  Pulmonary nodules, see nodule follow-up recommendations below       1/13/25 RUQ US   1.  Hepatomegaly   2.  Echogenic liver, compatible with fatty change versus fibrosis       Medications:  Current Facility-Administered Medications   Medication Dose    Pharmacy Consult: Enteral tube insertion - review meds/change route/product selection      Respiratory Therapy Consult      Pharmacy Consult Request ...Pain Management Review 1 Each  1 Each    ondansetron (Zofran) syringe/vial injection 4 mg  4 mg    ondansetron (Zofran ODT) dispertab 4 mg  4 mg    senna-docusate (Pericolace Or Senokot S) 8.6-50 MG per tablet 1 Tablet  1 Tablet    senna-docusate (Pericolace Or Senokot S) 8.6-50 MG per tablet 1 Tablet  1 Tablet    polyethylene glycol/lytes (Miralax) Packet 1 Packet  1 Packet    magnesium hydroxide (Milk Of Magnesia) suspension 30 mL  30 mL    bisacodyl (Dulcolax) suppository 10 mg  10 mg    LR infusion      [START ON 1/15/2025] enoxaparin (Lovenox) inj 40 mg  40 mg    acetaminophen (Tylenol) tablet 1,000 mg  1,000 mg    Followed by    [START ON 1/19/2025] acetaminophen (Tylenol) tablet 1,000 mg  1,000 mg    celecoxib (CeleBREX) capsule 200 mg  200 mg    Followed  by    [START ON 1/19/2025] celecoxib (CeleBREX) capsule 200 mg  200 mg    oxyCODONE immediate-release (Roxicodone) tablet 5 mg  5 mg    Or    oxyCODONE immediate-release (Roxicodone) tablet 10 mg  10 mg    Or    HYDROmorphone (Dilaudid) injection 0.5 mg  0.5 mg    famotidine (Pepcid) tablet 20 mg  20 mg    Or    famotidine (Pepcid) injection 20 mg  20 mg    docusate sodium (Colace) oral solution 100 mg  100 mg     No current outpatient medications on file.         ASSESSMENT/PLAN:    Jamey Mojica is a 43 y/o Uzbek speaking male who presented to the ED yesterday for severe epigastric abdominal pain secondary to SBO. Hospital day #2.     # Small Bowel Obstruction   Assessment and Plan   Chief compliant included acute epigastric pain. No hx of hernias, abdominal surgery or intestinal disorders. Physical exam pertinent for epigastric tenderness and abdominal distension. CBC indicated leukocytosis and hemoconcentration. No electrolyte derangements or acidosis noted on CMP.   1/13/25 Abdomen/Pelvis CT- Fluid-filled proximal small bowel loops with distal decompression and probable right lower abdominal transition point, appearance favoring changes of small bowel obstruction. Given the improvement of symptoms w/ NG tube, and lack of red flag signs conservative management of SBO remains appropriate.   -Monitor clinically w/ serial abdominal exams   -NPO w/ NG decompression   -Small bowel follow through tomorrow if no clinical improvement.       Dispo: Trixie Bello, MS3  [facility]

## 2025-01-14 NOTE — ASSESSMENT & PLAN NOTE
Conservative management with nasogastric decompression and bowel rest.   1/14 NG tube remain to suction.   1/15 SBFT negative for obstruction.  Interval removal of nasogastric tube  1/16 Discharge home if tolerating regular diet this AM.

## 2025-01-14 NOTE — PROGRESS NOTES
4 Eyes Skin Assessment Completed by FERNANDA Medina and FERNANDA Bella.    Head WDL  Ears WDL  Nose NG to R nare  Mouth WDL  Neck WDL  Breast/Chest WDL  Shoulder Blades WDL  Spine WDL  (R) Arm/Elbow/Hand PIV  (L) Arm/Elbow/Hand WDL  Abdomen WDL  Groin WDL  Scrotum/Coccyx/Buttocks WDL  (R) Leg WDL  (L) Leg WDL  (R) Heel/Foot/Toe WDL  (L) Heel/Foot/Toe WDL    Devices In Places Blood Pressure Cuff, Pulse Ox, and SCD's    Interventions In Place Pillows and Heels Loaded W/Pillows    Possible Skin Injury No    Pictures Uploaded Into Epic N/A  Wound Consult Placed N/A  RN Wound Prevention Protocol Ordered No

## 2025-01-15 ENCOUNTER — APPOINTMENT (OUTPATIENT)
Dept: RADIOLOGY | Facility: MEDICAL CENTER | Age: 45
DRG: 390 | End: 2025-01-15
Attending: STUDENT IN AN ORGANIZED HEALTH CARE EDUCATION/TRAINING PROGRAM
Payer: COMMERCIAL

## 2025-01-15 LAB
ANION GAP SERPL CALC-SCNC: 7 MMOL/L (ref 7–16)
BASOPHILS # BLD AUTO: 0.7 % (ref 0–1.8)
BASOPHILS # BLD: 0.06 K/UL (ref 0–0.12)
BUN SERPL-MCNC: 12 MG/DL (ref 8–22)
CALCIUM SERPL-MCNC: 8.9 MG/DL (ref 8.5–10.5)
CHLORIDE SERPL-SCNC: 106 MMOL/L (ref 96–112)
CO2 SERPL-SCNC: 26 MMOL/L (ref 20–33)
CREAT SERPL-MCNC: 0.83 MG/DL (ref 0.5–1.4)
EOSINOPHIL # BLD AUTO: 0.92 K/UL (ref 0–0.51)
EOSINOPHIL NFR BLD: 10.6 % (ref 0–6.9)
ERYTHROCYTE [DISTWIDTH] IN BLOOD BY AUTOMATED COUNT: 40.4 FL (ref 35.9–50)
GFR SERPLBLD CREATININE-BSD FMLA CKD-EPI: 109 ML/MIN/1.73 M 2
GLUCOSE BLD STRIP.AUTO-MCNC: 119 MG/DL (ref 65–99)
GLUCOSE SERPL-MCNC: 124 MG/DL (ref 65–99)
HCT VFR BLD AUTO: 46.3 % (ref 42–52)
HGB BLD-MCNC: 15.7 G/DL (ref 14–18)
IMM GRANULOCYTES # BLD AUTO: 0.02 K/UL (ref 0–0.11)
IMM GRANULOCYTES NFR BLD AUTO: 0.2 % (ref 0–0.9)
LYMPHOCYTES # BLD AUTO: 2.03 K/UL (ref 1–4.8)
LYMPHOCYTES NFR BLD: 23.5 % (ref 22–41)
MCH RBC QN AUTO: 29.2 PG (ref 27–33)
MCHC RBC AUTO-ENTMCNC: 33.9 G/DL (ref 32.3–36.5)
MCV RBC AUTO: 86.1 FL (ref 81.4–97.8)
MONOCYTES # BLD AUTO: 1.05 K/UL (ref 0–0.85)
MONOCYTES NFR BLD AUTO: 12.2 % (ref 0–13.4)
NEUTROPHILS # BLD AUTO: 4.56 K/UL (ref 1.82–7.42)
NEUTROPHILS NFR BLD: 52.8 % (ref 44–72)
NRBC # BLD AUTO: 0 K/UL
NRBC BLD-RTO: 0 /100 WBC (ref 0–0.2)
PLATELET # BLD AUTO: 181 K/UL (ref 164–446)
PMV BLD AUTO: 10.6 FL (ref 9–12.9)
POTASSIUM SERPL-SCNC: 3.7 MMOL/L (ref 3.6–5.5)
RBC # BLD AUTO: 5.38 M/UL (ref 4.7–6.1)
SODIUM SERPL-SCNC: 139 MMOL/L (ref 135–145)
WBC # BLD AUTO: 8.6 K/UL (ref 4.8–10.8)

## 2025-01-15 PROCEDURE — 700105 HCHG RX REV CODE 258: Performed by: SURGERY

## 2025-01-15 PROCEDURE — 700102 HCHG RX REV CODE 250 W/ 637 OVERRIDE(OP): Performed by: SURGERY

## 2025-01-15 PROCEDURE — 700117 HCHG RX CONTRAST REV CODE 255: Performed by: STUDENT IN AN ORGANIZED HEALTH CARE EDUCATION/TRAINING PROGRAM

## 2025-01-15 PROCEDURE — 700111 HCHG RX REV CODE 636 W/ 250 OVERRIDE (IP): Mod: JZ | Performed by: SURGERY

## 2025-01-15 PROCEDURE — 80048 BASIC METABOLIC PNL TOTAL CA: CPT

## 2025-01-15 PROCEDURE — 74250 X-RAY XM SM INT 1CNTRST STD: CPT

## 2025-01-15 PROCEDURE — 99232 SBSQ HOSP IP/OBS MODERATE 35: CPT | Performed by: NURSE PRACTITIONER

## 2025-01-15 PROCEDURE — 85025 COMPLETE CBC W/AUTO DIFF WBC: CPT

## 2025-01-15 PROCEDURE — 770001 HCHG ROOM/CARE - MED/SURG/GYN PRIV*

## 2025-01-15 PROCEDURE — A9270 NON-COVERED ITEM OR SERVICE: HCPCS | Performed by: SURGERY

## 2025-01-15 PROCEDURE — 82962 GLUCOSE BLOOD TEST: CPT

## 2025-01-15 RX ORDER — ONDANSETRON 4 MG/1
4 TABLET, ORALLY DISINTEGRATING ORAL EVERY 4 HOURS PRN
Status: DISCONTINUED | OUTPATIENT
Start: 2025-01-15 | End: 2025-01-16 | Stop reason: HOSPADM

## 2025-01-15 RX ORDER — FAMOTIDINE 20 MG/1
20 TABLET, FILM COATED ORAL 2 TIMES DAILY
Status: DISCONTINUED | OUTPATIENT
Start: 2025-01-15 | End: 2025-01-16 | Stop reason: HOSPADM

## 2025-01-15 RX ORDER — CELECOXIB 200 MG/1
200 CAPSULE ORAL 2 TIMES DAILY PRN
Status: DISCONTINUED | OUTPATIENT
Start: 2025-01-19 | End: 2025-01-16 | Stop reason: HOSPADM

## 2025-01-15 RX ORDER — AMOXICILLIN 250 MG
1 CAPSULE ORAL NIGHTLY
Status: DISCONTINUED | OUTPATIENT
Start: 2025-01-15 | End: 2025-01-16 | Stop reason: HOSPADM

## 2025-01-15 RX ORDER — CELECOXIB 200 MG/1
200 CAPSULE ORAL 2 TIMES DAILY
Status: DISCONTINUED | OUTPATIENT
Start: 2025-01-15 | End: 2025-01-16 | Stop reason: HOSPADM

## 2025-01-15 RX ORDER — ACETAMINOPHEN 500 MG
1000 TABLET ORAL EVERY 6 HOURS
Status: DISCONTINUED | OUTPATIENT
Start: 2025-01-16 | End: 2025-01-16 | Stop reason: HOSPADM

## 2025-01-15 RX ORDER — OXYCODONE HYDROCHLORIDE 10 MG/1
10 TABLET ORAL
Status: DISCONTINUED | OUTPATIENT
Start: 2025-01-15 | End: 2025-01-16 | Stop reason: HOSPADM

## 2025-01-15 RX ORDER — HYDROMORPHONE HYDROCHLORIDE 1 MG/ML
0.5 INJECTION, SOLUTION INTRAMUSCULAR; INTRAVENOUS; SUBCUTANEOUS
Status: DISCONTINUED | OUTPATIENT
Start: 2025-01-15 | End: 2025-01-16 | Stop reason: HOSPADM

## 2025-01-15 RX ORDER — AMOXICILLIN 250 MG
1 CAPSULE ORAL
Status: DISCONTINUED | OUTPATIENT
Start: 2025-01-15 | End: 2025-01-16 | Stop reason: HOSPADM

## 2025-01-15 RX ORDER — DOCUSATE SODIUM 50 MG/5ML
100 LIQUID ORAL 2 TIMES DAILY
Status: DISCONTINUED | OUTPATIENT
Start: 2025-01-15 | End: 2025-01-16 | Stop reason: HOSPADM

## 2025-01-15 RX ORDER — OXYCODONE HYDROCHLORIDE 5 MG/1
5 TABLET ORAL
Status: DISCONTINUED | OUTPATIENT
Start: 2025-01-15 | End: 2025-01-16 | Stop reason: HOSPADM

## 2025-01-15 RX ORDER — ACETAMINOPHEN 500 MG
1000 TABLET ORAL EVERY 6 HOURS PRN
Status: DISCONTINUED | OUTPATIENT
Start: 2025-01-19 | End: 2025-01-16 | Stop reason: HOSPADM

## 2025-01-15 RX ORDER — POLYETHYLENE GLYCOL 3350 17 G/17G
1 POWDER, FOR SOLUTION ORAL 2 TIMES DAILY
Status: DISCONTINUED | OUTPATIENT
Start: 2025-01-15 | End: 2025-01-16 | Stop reason: HOSPADM

## 2025-01-15 RX ADMIN — CELECOXIB 200 MG: 200 CAPSULE ORAL at 06:06

## 2025-01-15 RX ADMIN — ENOXAPARIN SODIUM 40 MG: 100 INJECTION SUBCUTANEOUS at 17:17

## 2025-01-15 RX ADMIN — DOCUSATE SODIUM 100 MG: 50 LIQUID ORAL at 06:06

## 2025-01-15 RX ADMIN — FAMOTIDINE 20 MG: 10 INJECTION, SOLUTION INTRAVENOUS at 06:06

## 2025-01-15 RX ADMIN — ACETAMINOPHEN 1000 MG: 500 TABLET ORAL at 16:35

## 2025-01-15 RX ADMIN — ACETAMINOPHEN 1000 MG: 500 TABLET ORAL at 23:23

## 2025-01-15 RX ADMIN — SODIUM CHLORIDE, POTASSIUM CHLORIDE, SODIUM LACTATE AND CALCIUM CHLORIDE: 600; 310; 30; 20 INJECTION, SOLUTION INTRAVENOUS at 06:05

## 2025-01-15 RX ADMIN — FAMOTIDINE 20 MG: 10 INJECTION, SOLUTION INTRAVENOUS at 17:17

## 2025-01-15 RX ADMIN — OXYCODONE 5 MG: 5 TABLET ORAL at 19:40

## 2025-01-15 RX ADMIN — IOHEXOL 150 ML: 350 INJECTION, SOLUTION INTRAVENOUS at 08:15

## 2025-01-15 RX ADMIN — ACETAMINOPHEN 1000 MG: 500 TABLET ORAL at 06:06

## 2025-01-15 RX ADMIN — SENNOSIDES AND DOCUSATE SODIUM 1 TABLET: 50; 8.6 TABLET ORAL at 19:40

## 2025-01-15 ASSESSMENT — PAIN DESCRIPTION - PAIN TYPE
TYPE: ACUTE PAIN

## 2025-01-15 NOTE — PROGRESS NOTES
"  /89   Pulse 71   Temp 36.9 °C (98.4 °F) (Temporal)   Resp 18   Ht 1.65 m (5' 4.96\")   Wt 86 kg (189 lb 9.5 oz)   SpO2 97%     SBFT with no obstruction. (+) BM.     - Clamp NG tube. Trial clears.  If tolerating clears DC NG tube and advance diet.         "

## 2025-01-15 NOTE — PROGRESS NOTES
Patient has NG tube in place: Right nare    Skin integrity beneath NG tube assessed with FERNANDA Jaime    Skin underneath NG tube is visible: yes    NG retaped to visualize skin underneath: yes    Skin description: skin intact, no redness or irritation

## 2025-01-15 NOTE — PROGRESS NOTES
Assumed care of patient at 1900. Bedside report received. Assessment complete.  used.    A&O x4. Patient calls appropriately.  Reports no pain at this time.  Denies SOB. O2 sats >90% on RA  NPO. Denies N/V.  + void. + flatus. Last BM 1/14.  NG tube to LCS.  Skin per flowsheets  Mobility: SBA    Reviewed plan of care with patient. All needs met at this time. Call light and belongings within reach. Fall precautions in place. Hourly rounding in place.

## 2025-01-15 NOTE — PROGRESS NOTES
Bedside report received.  Assessment complete.  A&O x 4. Thai speaking only,  used. Patient calls appropriately.  Patient ambulates with no assist.    Patient has 0/10 pain.   Denies N&V. Patient is NPO sips w/meds. NG to R nare in place, to low continuous suction per orders.  + void, + flatus, LBM 1/14.  Patient denies SOB.  Review plan with of care with patient. Call light and personal belongings within reach. Hourly rounding in place. All needs met at this time.    Patient off unit with transport to radiology at this time.

## 2025-01-15 NOTE — PROGRESS NOTES
Virtual Nurse rounding complete. Family at bedside to translate.     Round Needs: No needs at this time.

## 2025-01-15 NOTE — CARE PLAN
Problem: Pain - Standard  Goal: Alleviation of pain or a reduction in pain to the patient’s comfort goal  Outcome: Progressing     Problem: Knowledge Deficit - Standard  Goal: Patient and family/care givers will demonstrate understanding of plan of care, disease process/condition, diagnostic tests and medications  Outcome: Progressing     Problem: Bowel Elimination  Goal: Establish and maintain regular bowel function  Outcome: Progressing     The patient is Stable - Low risk of patient condition declining or worsening    Shift Goals  Clinical Goals: NGT management, pain control, bowel function  Patient Goals: comfort    Progress made toward(s) clinical / shift goals:  NG tube to suction for decompression, +output. Patient denies pain throughout night. Patient reported BM 1/14 and +flatus, +bowel sounds. SBFT today    Patient is not progressing towards the following goals:

## 2025-01-15 NOTE — NON-PROVIDER
Acute Care Surgery Progress Note     Date: 1/15/2025 / Time: 7:11 AM     Patient:  Jamey Mojica - 44 y.o. male  PMD: No primary care provider on file.  Hospital Day # Hospital Day: 3    SUBJECTIVE:   Jamey Mojica is a 43 y/o Chinese speaking male who presented to the ED yesterday for severe epigastric abdominal pain secondary to SBO.         Current Therapies/Plan   -NG decompression, serial abdominal exam  -Follow up with small bowel follow through today  OBJECTIVE:   Vitals:    Temp (24hrs), Av.8 °C (98.3 °F), Min:36.7 °C (98.1 °F), Max:37 °C (98.6 °F)     Oxygen: Pulse Oximetry: 97 %, O2 (LPM): 1, O2 Delivery Device: Silicone Nasal Cannula  Patient Vitals for the past 24 hrs:   BP Temp Temp src Pulse Resp SpO2   01/15/25 0431 129/86 36.7 °C (98.1 °F) Temporal 67 18 97 %   01/15/25 0019 127/81 36.8 °C (98.2 °F) Temporal 68 17 96 %   25 2115 132/87 36.8 °C (98.2 °F) Temporal 76 18 93 %   25 1600 127/88 37 °C (98.6 °F) Temporal 100 16 94 %   25 1212 135/82 36.9 °C (98.4 °F) Temporal 94 16 95 %   25 0956 126/87 -- -- 95 14 94 %   25 0903 122/84 -- -- 96 20 93 %   25 0833 131/81 -- -- 94 16 97 %   25 0803 120/80 -- -- 93 17 97 %   25 0733 121/85 -- -- 92 18 98 %       In/Out:    I/O last 3 completed shifts:  In: 2415.7 [I.V.:2415.7]  Out: 1140 [Urine:900]    IV Fluids/Feeds: None  Lines/Tubes: Nasogastric Tube     Physical Exam  Gen:  NAD  HEENT: MMM, EOMI  Cardio: RRR, clear s1/s2, no murmur  Resp:  Equal bilat, clear to auscultation  GI/: Soft, non-distended, no TTP, normal bowel sounds, no guarding/rebound  Neuro: Non-focal, Gross intact, no deficits  Skin/Extremities: Cap refill <3sec, warm/well perfused, no rash, normal extremities      Labs/X-ray:  Recent/pertinent lab results & imaging reviewed.   1/15/25 CBC WNL  1/15/25 CMP WNL     Medications:  Current Facility-Administered Medications   Medication Dose    Pharmacy Consult: Enteral tube  insertion - review meds/change route/product selection      Respiratory Therapy Consult      Pharmacy Consult Request ...Pain Management Review 1 Each  1 Each    ondansetron (Zofran) syringe/vial injection 4 mg  4 mg    ondansetron (Zofran ODT) dispertab 4 mg  4 mg    senna-docusate (Pericolace Or Senokot S) 8.6-50 MG per tablet 1 Tablet  1 Tablet    senna-docusate (Pericolace Or Senokot S) 8.6-50 MG per tablet 1 Tablet  1 Tablet    polyethylene glycol/lytes (Miralax) Packet 1 Packet  1 Packet    magnesium hydroxide (Milk Of Magnesia) suspension 30 mL  30 mL    bisacodyl (Dulcolax) suppository 10 mg  10 mg    LR infusion      enoxaparin (Lovenox) inj 40 mg  40 mg    acetaminophen (Tylenol) tablet 1,000 mg  1,000 mg    Followed by    [START ON 1/19/2025] acetaminophen (Tylenol) tablet 1,000 mg  1,000 mg    celecoxib (CeleBREX) capsule 200 mg  200 mg    Followed by    [START ON 1/19/2025] celecoxib (CeleBREX) capsule 200 mg  200 mg    oxyCODONE immediate-release (Roxicodone) tablet 5 mg  5 mg    Or    oxyCODONE immediate release (Roxicodone) tablet 10 mg  10 mg    Or    HYDROmorphone (Dilaudid) injection 0.5 mg  0.5 mg    famotidine (Pepcid) tablet 20 mg  20 mg    Or    famotidine (Pepcid) injection 20 mg  20 mg    docusate sodium (Colace) oral solution 100 mg  100 mg         ASSESSMENT/PLAN:   Jamey Mojica is a 43 y/o Croatian speaking male who presented to the ED 1/12/25  for severe epigastric abdominal pain secondary to SBO. Hospital day #3.      # Small Bowel Obstruction   Assessment and Plan   Chief compliant included acute epigastric pain. No hx of hernias, abdominal surgery or intestinal disorders. Physical exam remains pertinent for epigastric tenderness and abdominal distension. Hemoconcentration and leukocytosis resolved on today's CBC. No electrolyte derangements or acidosis noted on CMP.   1/13/25 Abdomen/Pelvis CT- Fluid-filled proximal small bowel loops with distal decompression and probable right  lower abdominal transition point, appearance favoring changes of small bowel obstruction. Given the lack of improvement of symptoms w/ NG tube, and lack of red flag signs conservative management with small bowel follow through today is appropriate.  -NPO w/ NG decompression   -Small bowel follow through today.     Dispo: Trixie Bello, MS3  [facility]

## 2025-01-15 NOTE — PROGRESS NOTES
"  DATE: 1/15/2025    Hospital day #2 small bowel obstruction    INTERVAL EVENTS:    Abdomen with mild distention.  No rebound tenderness or guarding.  NG tube to suction.      REVIEW OF SYSTEMS:  Comprehensive review of systems is negative with the exception of the aforementioned HPI, PMH, and PSH bullets in accordance with CMS guidelines.    PHYSICAL EXAMINATION:  Vital Signs: /86   Pulse 67   Temp 36.7 °C (98.1 °F) (Temporal)   Resp 18   Ht 1.65 m (5' 4.96\")   Wt 86 kg (189 lb 9.5 oz)   SpO2 97%   Physical Exam  Vitals and nursing note reviewed.   HENT:      Head: Normocephalic.      Nose:      Comments: Nasogastric tube to suction.  Cardiovascular:      Rate and Rhythm: Normal rate.   Pulmonary:      Effort: No respiratory distress.   Abdominal:      General: There is distension (mild).      Palpations: Abdomen is soft.      Tenderness: There is no abdominal tenderness.   Musculoskeletal:      Comments: Moves all extremities   Skin:     General: Skin is warm and dry.      Capillary Refill: Capillary refill takes less than 2 seconds.   Neurological:      Mental Status: He is alert and oriented to person, place, and time.   Psychiatric:         Mood and Affect: Mood normal.         Behavior: Behavior normal.       LABORATORY VALUES:  Recent Labs     01/13/25  2149 01/15/25  0155   WBC 17.5* 8.6   RBC 6.44* 5.38   HEMOGLOBIN 18.9* 15.7   HEMATOCRIT 53.3* 46.3   MCV 82.8 86.1   MCH 29.3 29.2   MCHC 35.5 33.9   RDW 37.8 40.4   PLATELETCT 264 181   MPV 10.5 10.6     Recent Labs     01/13/25 2149 01/14/25  0400 01/15/25  0155   SODIUM 140 137 139   POTASSIUM 4.2 4.5 3.7   CHLORIDE 103 104 106   CO2 21 23 26   GLUCOSE 176* 163* 124*   BUN 10 10 12   CREATININE 0.83 0.87 0.83   CALCIUM 10.0 9.3 8.9     Recent Labs     01/13/25 2149   ASTSGOT 30   ALTSGPT 58*   TBILIRUBIN 0.5   ALKPHOSPHAT 100*   GLOBULIN 3.5           IMAGING:  DX-ABDOMEN FOR TUBE PLACEMENT   Final Result         1.  Nonspecific bowel gas " pattern in the upper abdomen.   2.  Nasogastric tube tip terminates overlying the expected location of the gastric body.   3.  Linear densities the bilateral lung bases favors changes of atelectasis      DX-CHEST-LIMITED (1 VIEW)   Final Result         1.  Left basilar atelectasis, no focal infiltrate   2.  Cardiomegaly      CT-ABDOMEN-PELVIS WITH   Final Result         1.  Fluid-filled proximal small bowel loops with distal decompression and probable right lower abdominal transition point, appearance favoring changes of small bowel obstruction.   2.  Hepatomegaly   3.  Changes of hepatic steatosis   4.  Pulmonary nodules, see nodule follow-up recommendations below.      Fleischner Society pulmonary nodule recommendations:   Low Risk: No routine follow-up      High Risk: Optional CT at 12 months      Comments: Use most suspicious nodule as guide to management. Follow-up intervals may vary according to size and risk.      Low Risk - Minimal or absent history of smoking and of other known risk factors.      High Risk - History of smoking or of other known risk factors.      Note: These recommendations do not apply to lung cancer screening, patients with immunosuppression, or patients with known primary cancer.      Fleischner Society 2017 Guidelines for Management of Incidentally Detected Pulmonary Nodules in Adults         US-RUQ   Final Result         1.  Hepatomegaly   2.  Echogenic liver, compatible with fatty change versus fibrosis.      DX-SMALL BOWEL SERIES    (Results Pending)       ASSESSMENT AND PLAN:  * Small bowel obstruction, partial (HCC)- (present on admission)  Assessment & Plan  Conservative management with nasogastric decompression and bowel rest.   1/14 NG tube remain to suction.   1/15 SBFT          ___________________________________OLEKSANDR Hopper.    DD: 1/15/2025  6:39 AM

## 2025-01-16 VITALS
RESPIRATION RATE: 18 BRPM | BODY MASS INDEX: 31.59 KG/M2 | HEIGHT: 65 IN | TEMPERATURE: 98.2 F | SYSTOLIC BLOOD PRESSURE: 121 MMHG | HEART RATE: 71 BPM | WEIGHT: 189.6 LBS | OXYGEN SATURATION: 94 % | DIASTOLIC BLOOD PRESSURE: 76 MMHG

## 2025-01-16 LAB
ANION GAP SERPL CALC-SCNC: 11 MMOL/L (ref 7–16)
BASOPHILS # BLD AUTO: 0.7 % (ref 0–1.8)
BASOPHILS # BLD: 0.05 K/UL (ref 0–0.12)
BUN SERPL-MCNC: 9 MG/DL (ref 8–22)
CALCIUM SERPL-MCNC: 9.1 MG/DL (ref 8.5–10.5)
CHLORIDE SERPL-SCNC: 104 MMOL/L (ref 96–112)
CO2 SERPL-SCNC: 25 MMOL/L (ref 20–33)
CREAT SERPL-MCNC: 0.85 MG/DL (ref 0.5–1.4)
EOSINOPHIL # BLD AUTO: 0.82 K/UL (ref 0–0.51)
EOSINOPHIL NFR BLD: 10.8 % (ref 0–6.9)
ERYTHROCYTE [DISTWIDTH] IN BLOOD BY AUTOMATED COUNT: 39.6 FL (ref 35.9–50)
GFR SERPLBLD CREATININE-BSD FMLA CKD-EPI: 108 ML/MIN/1.73 M 2
GLUCOSE SERPL-MCNC: 92 MG/DL (ref 65–99)
HCT VFR BLD AUTO: 45 % (ref 42–52)
HGB BLD-MCNC: 15.3 G/DL (ref 14–18)
IMM GRANULOCYTES # BLD AUTO: 0.01 K/UL (ref 0–0.11)
IMM GRANULOCYTES NFR BLD AUTO: 0.1 % (ref 0–0.9)
LYMPHOCYTES # BLD AUTO: 2.54 K/UL (ref 1–4.8)
LYMPHOCYTES NFR BLD: 33.4 % (ref 22–41)
MAGNESIUM SERPL-MCNC: 1.9 MG/DL (ref 1.5–2.5)
MCH RBC QN AUTO: 29.3 PG (ref 27–33)
MCHC RBC AUTO-ENTMCNC: 34 G/DL (ref 32.3–36.5)
MCV RBC AUTO: 86.2 FL (ref 81.4–97.8)
MONOCYTES # BLD AUTO: 0.72 K/UL (ref 0–0.85)
MONOCYTES NFR BLD AUTO: 9.5 % (ref 0–13.4)
NEUTROPHILS # BLD AUTO: 3.46 K/UL (ref 1.82–7.42)
NEUTROPHILS NFR BLD: 45.5 % (ref 44–72)
NRBC # BLD AUTO: 0 K/UL
NRBC BLD-RTO: 0 /100 WBC (ref 0–0.2)
PHOSPHATE SERPL-MCNC: 3.4 MG/DL (ref 2.5–4.5)
PLATELET # BLD AUTO: 184 K/UL (ref 164–446)
PMV BLD AUTO: 11.1 FL (ref 9–12.9)
POTASSIUM SERPL-SCNC: 3.3 MMOL/L (ref 3.6–5.5)
RBC # BLD AUTO: 5.22 M/UL (ref 4.7–6.1)
SODIUM SERPL-SCNC: 140 MMOL/L (ref 135–145)
WBC # BLD AUTO: 7.6 K/UL (ref 4.8–10.8)

## 2025-01-16 PROCEDURE — 83735 ASSAY OF MAGNESIUM: CPT

## 2025-01-16 PROCEDURE — 700102 HCHG RX REV CODE 250 W/ 637 OVERRIDE(OP): Performed by: SURGERY

## 2025-01-16 PROCEDURE — A9270 NON-COVERED ITEM OR SERVICE: HCPCS | Mod: JZ | Performed by: NURSE PRACTITIONER

## 2025-01-16 PROCEDURE — 80048 BASIC METABOLIC PNL TOTAL CA: CPT

## 2025-01-16 PROCEDURE — 84100 ASSAY OF PHOSPHORUS: CPT

## 2025-01-16 PROCEDURE — A9270 NON-COVERED ITEM OR SERVICE: HCPCS | Performed by: SURGERY

## 2025-01-16 PROCEDURE — 99239 HOSP IP/OBS DSCHRG MGMT >30: CPT | Performed by: NURSE PRACTITIONER

## 2025-01-16 PROCEDURE — 700102 HCHG RX REV CODE 250 W/ 637 OVERRIDE(OP): Mod: JZ | Performed by: NURSE PRACTITIONER

## 2025-01-16 PROCEDURE — 85025 COMPLETE CBC W/AUTO DIFF WBC: CPT

## 2025-01-16 RX ORDER — POTASSIUM CHLORIDE 1500 MG/1
40 TABLET, EXTENDED RELEASE ORAL ONCE
Status: COMPLETED | OUTPATIENT
Start: 2025-01-16 | End: 2025-01-16

## 2025-01-16 RX ADMIN — ACETAMINOPHEN 1000 MG: 500 TABLET ORAL at 04:06

## 2025-01-16 RX ADMIN — POTASSIUM CHLORIDE 40 MEQ: 1500 TABLET, EXTENDED RELEASE ORAL at 07:21

## 2025-01-16 RX ADMIN — CELECOXIB 200 MG: 200 CAPSULE ORAL at 04:07

## 2025-01-16 RX ADMIN — FAMOTIDINE 20 MG: 20 TABLET, FILM COATED ORAL at 04:07

## 2025-01-16 RX ADMIN — DOCUSATE SODIUM 100 MG: 50 LIQUID ORAL at 04:07

## 2025-01-16 ASSESSMENT — PAIN DESCRIPTION - PAIN TYPE: TYPE: ACUTE PAIN

## 2025-01-16 NOTE — DISCHARGE SUMMARY
DISCHARGE  SUMMARY    DATE OF ADMISSION: 1/13/2025    DATE OF DISCHARGE: 1/16/2025    DISCHARGE DIAGNOSIS:  Principal Problem:    Small bowel obstruction, partial (HCC)  Resolved Problems:    * No resolved hospital problems. *      CONSULTATIONS:  None    PROCEDURES:  None    BRIEF HPI and HOSPITAL COURSE:  Patient is a 44-year-old  man who was admitted to the emergency department on the day of admission with a 12-hour history of severe epigastric abdominal pain.  Review the record indicates the pain was associated with nausea vomiting abdominal distention.  He denied any recent or current illness.  No history of previous abdominal surgery.  Workup and imaging was consistent with a small bowel obstruction.  This was managed conservatively.  Small bowel follow-through on 1/15/2025 demonstrated no obstruction.  His nasogastric tube was discontinued and his diet was advanced.    On the day of discharge, the patient was tolerating an oral diet he was having frequent bowel movements.  His abdomen was soft with no significant distention.  He had no acute abdominal pain.  Incidental findings did include pulmonary nodules.  These were discussed with the patient and the patient's son at the bedside.  They were instructed to follow-up with the primary care provider soon as possible upon discharge regarding this and for outpatient preventative care and maintenance.    DISPOSITION:   Discharged on 1/16/2025.  The patient and family were counseled and questions were answered. Specifically, signs and symptoms of infection, respiratory decompensation,abdominal pain and persistent or worsening pain were discussed and the patient agrees to seek medical attention if any of these develop.    DISCHARGE MEDICATIONS:  The patients controlled substance history was reviewed and a controlled substance use informed consent (if applicable) was provided by [facility] and the patient has been prescribed.      Medication List      You have not been prescribed any medications.         ACTIVITY:  As tolerated    DIET:  Resume prehospital diet    FOLLOW UP:  Primary Care Provider    Schedule an appointment as soon as possible for a visit  Follow up with primary care provider as soon as possible upon discharge for lung nodules and preventative health care.  If no primary care provider please establish.  If unable to establish follow up with local Critical access hospital resources which include but are not limited to the [facility], [facility] ,and [facility] [address]  [facility] [address] [phone]  Follow up  As needed    Call the office if you have: (1) Fevers to more than 101F, (2) Unusual chest or leg pain, (3) Drainage or fluid from incision that may be foul smelling, increased tenderness or soreness at the wound or the wound edges are no longer together, redness or swelling at the incision site. Do not hesitate to call with any other questions.    TIME SPENT ON DISCHARGE: 36 minutes      ____________________________________________  KATHIE López    DD: 1/16/2025 9:24 AM

## 2025-01-16 NOTE — PROGRESS NOTES
Bedside report received.  Assessment complete.  A&O x 4. Korean speaking only,  used. Patient calls appropriately.  Patient ambulates with no assist.    Patient has 0/10 pain.   Denies N&V. Patient tolerating regular diet.   + void, + flatus, LBM 1/16.  Patient denies SOB.  Review plan with of care with patient. Call light and personal belongings within reach. Hourly rounding in place. All needs met at this time.

## 2025-01-16 NOTE — PROGRESS NOTES
Received report from previous shift RN  Assessment complete.  A&O x 4. Patient calls appropriately.  Patient ambulates with no assistance.   Patient has 4/10 pain. Pain managed with prescribed medications.  Denies N&V. Tolerating reg diet.  + void, Last BM 1/15   Patient denies SOB  Patient sitting up in bed. Family at bedside.   Review plan with of care with patient. Call light and personal belongings with in reach. Hourly rounding in place. All needs met at this time.

## 2025-01-16 NOTE — CARE PLAN
The patient is Stable - Low risk of patient condition declining or worsening    Shift Goals  Clinical Goals: pain control, rest, ambulation  Patient Goals: POC, pain control  Family Goals: POC updates    Progress made toward(s) clinical / shift goals:  Patient ambulated in room. Ross controlledwith prescribed medications.      Problem: Pain - Standard  Goal: Alleviation of pain or a reduction in pain to the patient’s comfort goal  Outcome: Progressing     Problem: Knowledge Deficit - Standard  Goal: Patient and family/care givers will demonstrate understanding of plan of care, disease process/condition, diagnostic tests and medications  Outcome: Progressing

## 2025-01-16 NOTE — PROGRESS NOTES
"  DATE: 1/16/2025    Hospital day # 3 small bowel obstruction.     1/15 SBFT with no obstruction     INTERVAL EVENTS:    Tolerating interval removal of nasogastric tube and oral diet.  Abdomen remains nontender with no significant distention. (+) BM this AM.    REVIEW OF SYSTEMS:  Comprehensive review of systems is negative with the exception of the aforementioned HPI, PMH, and PSH bullets in accordance with CMS guidelines.    PHYSICAL EXAMINATION:  Vital Signs: /73   Pulse 61   Temp 36.6 °C (97.9 °F) (Temporal)   Resp 18   Ht 1.65 m (5' 4.96\")   Wt 86 kg (189 lb 9.5 oz)   SpO2 93%   Physical Exam  Vitals and nursing note reviewed.   HENT:      Head: Normocephalic.   Cardiovascular:      Rate and Rhythm: Normal rate.   Pulmonary:      Effort: No respiratory distress.   Abdominal:      General: There is no distension.      Palpations: Abdomen is soft.      Tenderness: There is no abdominal tenderness.   Musculoskeletal:      Comments: Moves all extremities   Skin:     General: Skin is warm and dry.      Capillary Refill: Capillary refill takes less than 2 seconds.   Neurological:      Mental Status: He is alert and oriented to person, place, and time.   Psychiatric:         Mood and Affect: Mood normal.         Behavior: Behavior normal.         LABORATORY VALUES:  Recent Labs     01/13/25 2149 01/15/25  0155 01/16/25  0027   WBC 17.5* 8.6 7.6   RBC 6.44* 5.38 5.22   HEMOGLOBIN 18.9* 15.7 15.3   HEMATOCRIT 53.3* 46.3 45.0   MCV 82.8 86.1 86.2   MCH 29.3 29.2 29.3   MCHC 35.5 33.9 34.0   RDW 37.8 40.4 39.6   PLATELETCT 264 181 184   MPV 10.5 10.6 11.1     Recent Labs     01/14/25  0400 01/15/25  0155 01/16/25  0027   SODIUM 137 139 140   POTASSIUM 4.5 3.7 3.3*   CHLORIDE 104 106 104   CO2 23 26 25   GLUCOSE 163* 124* 92   BUN 10 12 9   CREATININE 0.87 0.83 0.85   CALCIUM 9.3 8.9 9.1     Recent Labs     01/13/25 2149   ASTSGOT 30   ALTSGPT 58*   TBILIRUBIN 0.5   ALKPHOSPHAT 100*   GLOBULIN 3.5       "     IMAGING:  DX-SMALL BOWEL SERIES   Final Result         Unremarkable small bowel series.      DX-ABDOMEN FOR TUBE PLACEMENT   Final Result         1.  Nonspecific bowel gas pattern in the upper abdomen.   2.  Nasogastric tube tip terminates overlying the expected location of the gastric body.   3.  Linear densities the bilateral lung bases favors changes of atelectasis      DX-CHEST-LIMITED (1 VIEW)   Final Result         1.  Left basilar atelectasis, no focal infiltrate   2.  Cardiomegaly      CT-ABDOMEN-PELVIS WITH   Final Result         1.  Fluid-filled proximal small bowel loops with distal decompression and probable right lower abdominal transition point, appearance favoring changes of small bowel obstruction.   2.  Hepatomegaly   3.  Changes of hepatic steatosis   4.  Pulmonary nodules, see nodule follow-up recommendations below.      Fleischner Society pulmonary nodule recommendations:   Low Risk: No routine follow-up      High Risk: Optional CT at 12 months      Comments: Use most suspicious nodule as guide to management. Follow-up intervals may vary according to size and risk.      Low Risk - Minimal or absent history of smoking and of other known risk factors.      High Risk - History of smoking or of other known risk factors.      Note: These recommendations do not apply to lung cancer screening, patients with immunosuppression, or patients with known primary cancer.      Fleischner Society 2017 Guidelines for Management of Incidentally Detected Pulmonary Nodules in Adults         US-RUQ   Final Result         1.  Hepatomegaly   2.  Echogenic liver, compatible with fatty change versus fibrosis.          ASSESSMENT AND PLAN:  * Small bowel obstruction, partial (HCC)- (present on admission)  Assessment & Plan  Conservative management with nasogastric decompression and bowel rest.   1/14 NG tube remain to suction.   1/15 SBFT negative for obstruction.  Interval removal of nasogastric tube  1/16 Discharge  home if tolerating regular diet this AM.          ____________________________________     KATHIE López    DD: 1/16/2025  7:33 AM

## 2025-01-16 NOTE — PROGRESS NOTES
Discharge orders received.  Patient arrived to the discharge lounge.   #[id number] used.PIV removed by discharge RN. Meds to beds medications not ordered.  Instructions given, medications reviewed and general discharge education provided to patient.  Follow up appointments discussed.  Patient verbalized understanding of dc instructions and prescriptions.  Patient signed discharge instructions.  Patient verbalized he had all belongings with him, Denied having any home medications locked in our inpatient pharmacy that  he needs back.  Patient ambulated with steady gait from discharge lounge to private vehicle. Patient left via car with son to home in stable condition.

## 2025-01-16 NOTE — DISCHARGE INSTRUCTIONS
Obstrucción intestinal  Bowel Obstruction  Emilia obstrucción intestinal significa que hay algo que obstruye el intestino morales o el intestino grueso. Nova órgano se denomina intestino. Es el conducto jesus que conecta el estómago con el orificio entre las nalgas (el ano).  Cuando algo obstruye el intestino, los alimentos y los líquidos no pueden pasar por él de manera normal. Esta afección debe tratarse. El tratamiento depende de la causa y de la gravedad del problema.  ¿Cuáles son las causas?  Las causas más frecuentes de esta afección incluyen las siguientes:  Tejido cicatricial dentro del cuerpo debido a emilia cirugía anterior o a obed recibido ian X de miguel angel potencia (radiación).  Cirugía reciente en el abdomen. [place] afecta cómo se mueven los alimentos en los intestinos.  Algunas enfermedades, eva:  Irritación de la membrana que recubre el tubo digestivo (enfermedad de Crohn).  Irritación de las pequeñas bolsas que hay en el intestino (diverticulitis).  Crecimientos o tumores.  Un órgano o tejido que sobresale (hernia).  Giro del intestino sobre sí mismo (vólvulo).  Un objeto que se tragó.  Desplazamiento de emilia parte del intestino dentro de otra (invaginación intestinal).  ¿Cuáles son los signos o síntomas?  Los síntomas de esta afección incluyen:  Dolor en el abdomen.  Sensación de que va a vomitar (náuseas).  Vómitos.  Distensión en el abdomen.  Imposibilidad de eliminar gases.  Dificultad para defecar (estreñimiento).  Gran cantidad de flatulencias.  Materia fecal líquida (diarrea).  ¿Cómo se trata?  El tratamiento de esta afección puede incluir lo siguiente:  Administración de líquidos y analgésicos por un tubo (catéter) intravenoso. El médico puede decirle que no coma ni morgan si siente ganas de vomitar o está vomitando.  Consumir emilia dieta de líquidos mariangel shukri algunos días.  Insertar un tubo pequeño (sonda nasogástrica) a través de la nariz y la garganta hasta el estómago. [place] ayudará con el dolor,  las molestias y la sensación de que puede vomitar.  Cirugía. [place] puede ser necesario si otros tratamientos no funcionan.  Siga estas instrucciones en johnson casa:  Medicamentos  Use los medicamentos de venta nita y los recetados solamente eva se lo haya indicado el médico.  Si le recetaron un antibiótico, tómelo eva se lo haya indicado el médico. No deje de marin el antibiótico, aunque comience a sentirse mejor.  Indicaciones generales  Siga las indicaciones del médico respecto de lo que puede o no puede comer y beber. Es posible que deba hacer lo siguiente:  Solo ingiera líquidos mariangel hasta que se sienta mejor.  Evite los alimentos sólidos.  Retome hal actividades normales cuando el médico le diga que es seguro.  Sulma reposo eva se lo haya indicado el médico.  Levántese y camine un poco cada 1 a 2 horas. Pida ayuda si se siente débil o inestable.  Concurra a todas las visitas de seguimiento.  ¿Cómo se previene?  Después de tener jocelyn obstrucción intestinal, hay más probabilidades de tener otra. Usted puede hacer algunas cosas para evitar que vuelva a ocurrir.  Si tiene jocelyn enfermedad de larga duración (crónica), comuníquese con johnson médico si nota cambios o problemas.  Evite tener problemas para defecar. Es posible que deba marin medidas para prevenir o tratar los problemas para defecar:  Beber suficiente líquido para mantener el pis (la orina) de color amarillo pálido.  Usar medicamentos recetados o de venta nita.  [place] alimentos ricos en fibra. Entre ellos, frijoles, cereales integrales y frutas y verduras frescas.  Limitar los alimentos con alto contenido de grasa y azúcar. Estos incluyen alimentos fritos o dulces.  Manténgase activo. Pregúntele al médico qué ejercicios son seguros para usted.  Evite el estrés.  Ingiera lloyd comidas pequeñas y lloyd refrigerios al día.  Trabaje con un experto en dieta y nutrición (nutricionista) para elaborar un plan de comidas que sea adecuado para usted.  No fume ni consuma  ningún producto que contenga nicotina o tabaco. Si necesita ayuda para dejar de consumir estos productos, consulte al médico.  Comuníquese con un médico si:  Tiene fiebre.  Tiene escalofríos.  Solicite ayuda de inmediato si:  Siente dolor o cólicos que empeoran.  Vomita ambar.  Siente ganas de vomitar y la sensación dura mucho tiempo.  No puede dejar de vomitar.  No puede marin líquidos.  Se siente confundido.  Tiene mucha sed (se siente deshidratado).  Se le hincha más el vientre.  Se siente débil o se desmaya.  Resumen  Jocelyn obstrucción intestinal significa que hay algo que obstruye el intestino morales o el intestino grueso.  El tratamiento puede incluir la administración de líquidos intravenosos (i.v.) y analgésicos. También pueden darle jocelyn dieta de líquidos mariangel, colocarle un pequeño tubo en el estómago o realizarle jocelyn cirugía.  [place]is líquidos mariangel y evite los alimentos sólidos hasta que mejore, eva le haya indicado el médico.  Esta información no tiene eva fin reemplazar el consejo del médico. Asegúrese de hacerle al médico cualquier pregunta que tenga.  Document Revised: 03/04/2022 Document Reviewed: 03/04/2022  [employer] Patient Education © 2023 [employer]